# Patient Record
Sex: FEMALE | Race: WHITE
[De-identification: names, ages, dates, MRNs, and addresses within clinical notes are randomized per-mention and may not be internally consistent; named-entity substitution may affect disease eponyms.]

---

## 2018-09-25 ENCOUNTER — HOSPITAL ENCOUNTER (OUTPATIENT)
Dept: HOSPITAL 47 - RADMAMWWP | Age: 38
Discharge: HOME | End: 2018-09-25
Attending: FAMILY MEDICINE
Payer: COMMERCIAL

## 2018-09-25 DIAGNOSIS — N63.0: ICD-10-CM

## 2018-09-25 DIAGNOSIS — R92.8: Primary | ICD-10-CM

## 2018-09-25 PROCEDURE — 77066 DX MAMMO INCL CAD BI: CPT

## 2018-09-26 NOTE — USB
Reason for exam: additional evaluation requested from abnormal screening.



History:

Took other hormone beginning at age 24.



US Breast LT

Left complete breast ultrasound includes all four quadrants, the retroareolar 

region and axilla. Finding demonstrates a 1.5 x 1.4 x 1.2cm irregular, spiculated,

solid, vascular, hypoechoic lesion with calcifications at 3 o'clock and a 2.7 x 

2.4 x 1.2cm enlarged lymph node at the axilla.



These results were verbally communicated with the patient and result sheet given 

to the patient on 9/25/18.





ASSESSMENT: Highly suggestive of malignancy, BI-RAD 5



RECOMMENDATION:

Surgical consultation and ultrasound core biopsy of the left breast.



Called Dr. Calderon with mammographic findings.

Biopsy scheduled for 10/3/18 at 2:20.



PRELIMINARY REPORT CALLED AND FAXED TO DR. CALDERON ON 9/25/18.

## 2018-09-26 NOTE — MM
Reason for exam: clinical finding.



History:

Took other hormone beginning at age 24.



Physical Findings:

Nurse Summary: 1.5cm nodule in the left breast at 3 o'clock (nurse keena).



[facility] Diagnostic Mammo w CAD PARVIN

Bilateral CC and MLO view(s) were taken.  LM view(s) were taken of the left 

breast.

There are scattered fibroglandular densities.

Finding: There is an intermediate concern, suspicious 18 mm spiculated irregular 

mass located 4 cm from the nipple in the 3 o'clock position of the left breast 

consistent with mass associated suspicious calcifications extending posterior 

chest wall.



These results were verbally communicated with the patient and result sheet given 

to the patient on 9/25/15.





ASSESSMENT: Incomplete: need additional imaging evaluation, BI-RAD 0



RECOMMENDATION:

Ultrasound of the left breast.

## 2018-10-01 ENCOUNTER — HOSPITAL ENCOUNTER (OUTPATIENT)
Dept: HOSPITAL 47 - LABWHC1 | Age: 38
Discharge: HOME | End: 2018-10-01
Attending: NURSE PRACTITIONER
Payer: COMMERCIAL

## 2018-10-01 DIAGNOSIS — D68.59: Primary | ICD-10-CM

## 2018-10-01 PROCEDURE — 85246 CLOT FACTOR VIII VW ANTIGEN: CPT

## 2018-10-01 PROCEDURE — 36415 COLL VENOUS BLD VENIPUNCTURE: CPT

## 2018-10-10 ENCOUNTER — HOSPITAL ENCOUNTER (OUTPATIENT)
Dept: HOSPITAL 47 - RADUSWWP | Age: 38
Discharge: HOME | End: 2018-10-10
Attending: FAMILY MEDICINE
Payer: COMMERCIAL

## 2018-10-10 VITALS — TEMPERATURE: 98.1 F | DIASTOLIC BLOOD PRESSURE: 79 MMHG | SYSTOLIC BLOOD PRESSURE: 121 MMHG | HEART RATE: 89 BPM

## 2018-10-10 VITALS — RESPIRATION RATE: 16 BRPM

## 2018-10-10 VITALS — BODY MASS INDEX: 21 KG/M2

## 2018-10-10 DIAGNOSIS — C50.412: Primary | ICD-10-CM

## 2018-10-10 DIAGNOSIS — C77.3: ICD-10-CM

## 2018-10-10 PROCEDURE — 19083 BX BREAST 1ST LESION US IMAG: CPT

## 2018-10-10 PROCEDURE — 88342 IMHCHEM/IMCYTCHM 1ST ANTB: CPT

## 2018-10-10 PROCEDURE — 88305 TISSUE EXAM BY PATHOLOGIST: CPT

## 2018-10-10 PROCEDURE — 77065 DX MAMMO INCL CAD UNI: CPT

## 2018-10-10 PROCEDURE — 19084 BX BREAST ADD LESION US IMAG: CPT

## 2018-10-10 PROCEDURE — 88341 IMHCHEM/IMCYTCHM EA ADD ANTB: CPT

## 2018-10-10 NOTE — USB
EXAMINATION TYPE: US breast needle core addl LT, US breast needle core LT, MG 
diagnostic mammo LT wo CAD

 

DATE OF EXAM: 10/10/2018

 

COMPARISON: EXAMINATION TYPE: US breast needle core addl LT, US breast needle 
core LT, MG diagnostic mammo LT wo CAD

 

DATE OF EXAM: 10/10/2018

 

CLINICAL HISTORY: R92.8 ABNORMAL MAMMOGRAM.

 

TECHNIQUE: Ultrasound guided core biopsy of left breast x 2 (mass and axilla). 

 

COMPARISON: 9/25/2018

 

FINDINGS: The procedure of ultrasound guided core biopsy was explained to the 
patient.  Benefits, alternatives, and risks were discussed.  An informed 
consent was then obtained.  Preprocedural timeout was performed.

 

Site A (breast mass): The patient was placed in supine positioning for imaging 
and for the procedure. The overlying skin was prepped and draped in usual 
sterile fashion. 10 cc of lidocaine buffered with bicarbonate was used as 
anesthetic into the skin and 10 cc of lidocaine with epinephrine subcutaneous 
tissue up to the highly suspicious 1.5 x 1.4 x 1.2 cm mass with internal 
calcifications and vascularity at the 3:00 position.

 

Under ultrasound guidance, a 14-gauge Bard nonvacuum biopsy gun device was used 
to obtain 4 core samples.  Following this, a ribbon-shaped marker was left in 
mass.  

 

The patient tolerated the procedure well without any immediate complication.  

 

 

Site B ( left axillary lymph node): The patient was placed in supine 
positioning for imaging and for the procedure. The overlying skin was prepped 
and draped in usual sterile fashion. 10 cc of lidocaine buffered with 
bicarbonate was used as anesthetic into the skin and 10 cc of lidocaine with 
epinephrine subcutaneous tissue up to the suspicious left axillary lymph node 
with cortical thickening.

 

Under ultrasound guidance, an 18-gauge Bard nonvacuum biopsy gun device was 
used to obtain 3 core samples.  Following this, a hydromark biopsy marker was 
left in lymph node.  

 

The patient tolerated the procedure well without any immediate complication.  

 

Postprocedure imaging demonstrates appropriate placement of the ribbon-shaped 
biopsy marker. The Hydromark clip was well identified sonographically at the 
time of the procedure. The patient was kept in the radiology department for 
short stay after the procedure and then discharged home in stable condition.  

 

IMPRESSION: Successful, uncomplicated ultrasound guided core biopsy of area of 
the highly suspicious left breast mass and left axillary lymph node, full 
pathology results to follow. 

 

Pathology Results: Malignant



A.  BREAST, LEFT, THREE O'CLOCK, ULTRASOUND GUIDED CORE BIOPSY:  Invasive high 
grade ductal carcinoma (grade 3).  See Surgical Pathology Cancer Case Summary.



B.  LEFT AXILLA, CORE BIOPSY:  Lymph node involved by metastatic high grade 
carcinoma consistent with ductal breast primary.  



Recommendation

Surgical consult of the left breast.
MTDD

## 2019-09-11 ENCOUNTER — HOSPITAL ENCOUNTER (OUTPATIENT)
Dept: HOSPITAL 47 - RADECHMAIN | Age: 39
Discharge: HOME | End: 2019-09-11
Attending: INTERNAL MEDICINE
Payer: COMMERCIAL

## 2019-09-11 DIAGNOSIS — C50.812: ICD-10-CM

## 2019-09-11 DIAGNOSIS — I08.1: Primary | ICD-10-CM

## 2019-09-11 PROCEDURE — 93306 TTE W/DOPPLER COMPLETE: CPT

## 2019-09-11 NOTE — ECHOF
Referral Reason:C50.812 Malignant neoplasm of overlapping sites of



MEASUREMENTS

--------

HEIGHT: 154.9 cm

WEIGHT: 49.9 kg

BP: 90/50

RVIDd:   2.6 cm     (< 3.3)

IVSd:   0.8 cm     (0.6 - 1.1)

LVIDd:   3.8 cm     (3.9 - 5.3)

LVPWd:   0.9 cm     (0.6 - 1.1)

IVSs:   1.3 cm

LVIDs:   2.7 cm

LVPWs:   1.2 cm

LA Diam:   2.6 cm     (2.7 - 3.8)

LAESV Index (A-L):   16.24 ml/m

Ao Diam:   2.7 cm     (2.0 - 3.7)

AV Cusp:   2.0 cm     (1.5 - 2.6)

MV EXCURSION:   16.941 mm     (> 18.000)

MV EF SLOPE:   82 mm/s     (70 - 150)

EPSS:   0.3 cm

MV E Kofi:   0.88 m/s

MV DecT:   170 ms

MV A Kofi:   0.57 m/s

MV E/A Ratio:   1.54 

RAP:   5.00 mmHg

RVSP:   24.76 mmHg







FINDINGS

--------

Sinus rhythm.

This was a technically good study.

The left ventricular size is normal.   Left ventricular wall thickness is normal.   Overall left vent
ricular systolic function is normal with, an EF between 60 - 65 %.

The right ventricle is normal in size.

Normal LA  size by volume 22+/-6 ml/m2.

The right atrium is normal in size.

Interatrial and interventricular septum intact.

Aortic valve is trileaflet and is mildly thickened.

The mitral valve leaflets are mildly thickened.   There is trace to mild mitral regurgitation.

Mild tricuspid regurgitation present.   Right ventricular systolic pressure is normal at < 35 mmHg.

Trace/mild (physiologic)  pulmonic regurgitation.

The aortic root size is normal.

Normal inferior vena cava with normal inspiratory collapse consistent with estimated right atrial pre
ssure of  5 mmHg.

There is no pericardial effusion.



CONCLUSIONS

--------

1. Sinus rhythm.

2. This was a technically good study.

3. The left ventricular size is normal.

4. Left ventricular wall thickness is normal.

5. Overall left ventricular systolic function is normal with, an EF between 60 - 65 %.

6. The right ventricle is normal in size.

7. Normal LA size by volume 22+/-6 ml/m2.

8. The right atrium is normal in size.

9. Interatrial and interventricular septum intact.

10. Aortic valve is trileaflet and is mildly thickened.

11. The mitral valve leaflets are mildly thickened.

12. There is trace to mild mitral regurgitation.

13. Mild tricuspid regurgitation present.

14. Right ventricular systolic pressure is normal at < 35 mmHg.

15. Trace/mild (physiologic)  pulmonic regurgitation.

16. The aortic root size is normal.

17. Normal inferior vena cava with normal inspiratory collapse consistent with estimated right atrial
 pressure of  5 mmHg.

18. There is no pericardial effusion.





SONOGRAPHER: Bibi Sams RDCS

## 2020-09-22 ENCOUNTER — HOSPITAL ENCOUNTER (OUTPATIENT)
Dept: HOSPITAL 47 - RADUSWWP | Age: 40
Discharge: HOME | End: 2020-09-22
Attending: FAMILY MEDICINE
Payer: COMMERCIAL

## 2020-09-22 DIAGNOSIS — R94.5: Primary | ICD-10-CM

## 2020-09-22 PROCEDURE — 76700 US EXAM ABDOM COMPLETE: CPT

## 2020-09-22 NOTE — US
EXAMINATION TYPE: US abdomen complete

 

DATE OF EXAM: 9/22/2020

 

COMPARISON: NONE

 

CLINICAL HISTORY: R94.5 abnormal liver function study.

 

EXAM MEASUREMENTS:

 

Liver Length:  11.7 cm   

Gallbladder Wall:  0.2 cm   

CBD:  0.2 cm

Spleen:  10.1 cm   

Right Kidney: 10.1 x 4.3 x 5.4 cm 

Left Kidney:  10.1 x 5.3 x 4.6 cm   

 

 

 

Pancreas:  wnl

Liver:  wnl  

Gallbladder:  wnl

**Evidence for sonographic Rosario's sign: no

CBD:  wnl 

Spleen:  wnl   

Right Kidney:  No hydronephrosis or masses seen   

Left Kidney:  No hydronephrosis or masses seen   

Upper IVC:  wnl  

Abd Aorta:  wnl

 

IMPRESSION: 

1. Normal abdomen ultrasound

## 2020-09-30 ENCOUNTER — HOSPITAL ENCOUNTER (OUTPATIENT)
Dept: HOSPITAL 47 - RADUSWWP | Age: 40
Discharge: HOME | End: 2020-09-30
Payer: COMMERCIAL

## 2020-09-30 DIAGNOSIS — C50.912: Primary | ICD-10-CM

## 2020-09-30 DIAGNOSIS — R22.32: ICD-10-CM

## 2021-06-28 ENCOUNTER — HOSPITAL ENCOUNTER (EMERGENCY)
Dept: HOSPITAL 47 - EC | Age: 41
Discharge: HOME | End: 2021-06-28
Payer: COMMERCIAL

## 2021-06-28 VITALS
DIASTOLIC BLOOD PRESSURE: 69 MMHG | RESPIRATION RATE: 16 BRPM | HEART RATE: 89 BPM | SYSTOLIC BLOOD PRESSURE: 104 MMHG | TEMPERATURE: 98.1 F

## 2021-06-28 DIAGNOSIS — R25.2: ICD-10-CM

## 2021-06-28 DIAGNOSIS — Z88.5: ICD-10-CM

## 2021-06-28 DIAGNOSIS — M79.662: Primary | ICD-10-CM

## 2021-06-28 DIAGNOSIS — Z79.899: ICD-10-CM

## 2021-06-28 DIAGNOSIS — Z88.1: ICD-10-CM

## 2021-06-28 DIAGNOSIS — Z88.2: ICD-10-CM

## 2021-06-28 DIAGNOSIS — Z79.1: ICD-10-CM

## 2021-06-28 DIAGNOSIS — D68.0: ICD-10-CM

## 2021-06-28 DIAGNOSIS — F17.200: ICD-10-CM

## 2021-06-28 DIAGNOSIS — F41.9: ICD-10-CM

## 2021-06-28 DIAGNOSIS — Z90.49: ICD-10-CM

## 2021-06-28 DIAGNOSIS — M79.7: ICD-10-CM

## 2021-06-28 LAB
BASOPHILS # BLD AUTO: 0 K/UL (ref 0–0.2)
BASOPHILS NFR BLD AUTO: 1 %
EOSINOPHIL # BLD AUTO: 0.2 K/UL (ref 0–0.7)
EOSINOPHIL NFR BLD AUTO: 5 %
ERYTHROCYTE [DISTWIDTH] IN BLOOD BY AUTOMATED COUNT: 4.27 M/UL (ref 3.8–5.4)
ERYTHROCYTE [DISTWIDTH] IN BLOOD: 12.7 % (ref 11.5–15.5)
HCT VFR BLD AUTO: 40.3 % (ref 34–46)
HGB BLD-MCNC: 13.3 GM/DL (ref 11.4–16)
LYMPHOCYTES # SPEC AUTO: 1.4 K/UL (ref 1–4.8)
LYMPHOCYTES NFR SPEC AUTO: 28 %
MCH RBC QN AUTO: 31.3 PG (ref 25–35)
MCHC RBC AUTO-ENTMCNC: 33.1 G/DL (ref 31–37)
MCV RBC AUTO: 94.5 FL (ref 80–100)
MONOCYTES # BLD AUTO: 0.3 K/UL (ref 0–1)
MONOCYTES NFR BLD AUTO: 6 %
NEUTROPHILS # BLD AUTO: 3 K/UL (ref 1.3–7.7)
NEUTROPHILS NFR BLD AUTO: 59 %
PLATELET # BLD AUTO: 267 K/UL (ref 150–450)
WBC # BLD AUTO: 5 K/UL (ref 3.8–10.6)

## 2021-06-28 PROCEDURE — 85025 COMPLETE CBC W/AUTO DIFF WBC: CPT

## 2021-06-28 PROCEDURE — 36415 COLL VENOUS BLD VENIPUNCTURE: CPT

## 2021-06-28 PROCEDURE — 99284 EMERGENCY DEPT VISIT MOD MDM: CPT

## 2021-06-28 NOTE — US
EXAMINATION TYPE: US venous doppler duplex LE LT

 

DATE OF EXAM: 6/28/2021 4:11 PM

 

COMPARISON: 9/30/2020

 

CLINICAL HISTORY: pain. Left leg pain

 

SIDE PERFORMED: Left  

 

TECHNIQUE:  The lower extremity deep venous system is examined utilizing real time linear array sonog
luciano with graded compression, doppler sonography and color-flow sonography.

 

VESSELS IMAGED:

Common Femoral Vein

Deep Femoral Vein

Greater Saphenous Vein *

Femoral Vein

Popliteal Vein

Small Saphenous Vein *

Proximal Calf Veins

(* superficial vessels)

 

 

 

Left Leg:  Appears negative for DVT

 

Left calf: no abnormalities seen at patient's area of concern.

 

IMPRESSION:

1. No evidence of deep venous thrombosis in the left lower extremity veins.

## 2021-06-28 NOTE — ED
General Adult HPI





- General


Chief complaint: Extremity Problem,Nontraumatic


Stated complaint: poss DVT lt leg


Source: patient, family, RN notes reviewed


Mode of arrival: ambulatory


Limitations: no limitations





- History of Present Illness


Initial comments: 





41-year-old white female, alert and oriented 4, well-appearing, anxious, 

presents to the emergency room with complaints of left lower leg cramping and 

she feels a knot posteriorly with a pulling sensation.  Patient denies injuries.

 Patient states she is very active however went to [facility] and was told to 

come to the emergency room to rule out DVT.  Patient has no swelling in that leg

is able to ambulate with steady gait.  Patient states that she has a history of 

von Willebrand's and fibromyalgia, surgical history of appendectomy and 

hysterectomy.  Patient was seeing Dr. Norman is a primary care doctor but has h

ad blood work recently.  She called the primary office and they told her to have

a CBC done as well today.  Patient denies any headache, no nausea vomiting 

diarrhea or fevers.  Patient is well-appearing.


Location: left, lower extremity


Severity scale (1-10): 0


Quality: other (cramp)


Consistency: intermittent


Associated Symptoms: denies other symptoms





- Related Data


                                Home Medications











 Medication  Instructions  Recorded  Confirmed


 


Omeprazole 40 mg PO HS 10/02/18 06/28/21


 


Ramelteon [Rozerem] 8 mg PO HS 10/02/18 06/28/21


 


Buprenorphine [Butrans 20 MCG/HOUR] 1 patch TRANSDERM SANTIAGO 06/28/21 06/28/21


 


DULoxetine HCL [Cymbalta] 30 mg PO HS 06/28/21 06/28/21


 


Diclofenac Sodium [Voltaren] 75 mg PO BID 06/28/21 06/28/21


 


Methocarbamol [Robaxin-750] 750 mg PO Q8H 06/28/21 06/28/21


 


Nitrofurantoin Monohyd/M-Cryst 100 mg PO DAILY 06/28/21 06/28/21





[Macrobid]   


 


Vortioxetine Hydrobromide 10 mg PO HS 06/28/21 06/28/21





[Trintellix]   


 


ondansetron HCL [Zofran] 8 mg PO BID PRN 06/28/21 06/28/21


 


ondansetron HCL [Zofran] 8 mg PO HS 06/28/21 06/28/21


 


traMADol HCL 50 mg PO BID PRN 06/28/21 06/28/21











                                    Allergies











Allergy/AdvReac Type Severity Reaction Status Date / Time


 


oxycodone Allergy  Rash/Hives Verified 06/28/21 16:44


 


sulfamethoxazole Allergy  Swelling Verified 06/28/21 16:44





[From Bactrim]     


 


trimethoprim [From Bactrim] Allergy  Swelling Verified 06/28/21 16:44














Review of Systems


ROS Statement: 


Those systems with pertinent positive or pertinent negative responses have been 

documented in the HPI.





ROS Other: All systems not noted in ROS Statement are negative.





Past Medical History


Past Medical History: Blood Disorder, Fibromyalgia


Additional Past Medical History / Comment(s): mild Von Willebrand disease


History of Any Multi-Drug Resistant Organisms: None Reported


Past Surgical History: Appendectomy, Hysterectomy


Additional Past Surgical History / Comment(s): birth francis removal on head, 

bilateral heal spur removal


Past Anesthesia/Blood Transfusion Reactions: No Reported Reaction


Past Psychological History: Anxiety


Smoking Status: Current every day smoker


Past Alcohol Use History: None Reported


Past Drug Use History: None Reported





General Exam


Limitations: no limitations


General appearance: alert, in no apparent distress


Head exam: Present: atraumatic, normocephalic, normal inspection


Eye exam: Present: normal appearance, PERRL, EOMI.  Absent: scleral icterus, 

conjunctival injection, periorbital swelling


ENT exam: Present: normal exam, normal oropharynx, mucous membranes moist


Neck exam: Present: normal inspection, full ROM.  Absent: tenderness, 

meningismus, lymphadenopathy


Respiratory exam: Present: normal lung sounds bilaterally.  Absent: respiratory 

distress, wheezes, rales, rhonchi, stridor, chest wall tenderness, accessory 

muscle use, decreased breath sounds


Cardiovascular Exam: Present: regular rate, normal rhythm, normal heart sounds. 

Absent: systolic murmur, diastolic murmur, rubs, gallop, clicks


GI/Abdominal exam: Present: soft, normal bowel sounds.  Absent: distended, 

tenderness, guarding, rebound, rigid


  ** Left


Knee exam: Present: normal inspection, full ROM.  Absent: tenderness


Lower Leg exam: Present: normal inspection, full ROM, tenderness.  Absent: 

swelling, abrasion, laceration, ecchymosis, deformity, crepitus, dislocation, 

erythema, palpable cord, Homans' sign


Ankle exam: Present: normal inspection


Foot/Toe exam: Present: normal inspection


Neurovascular tendon exam: Present: no vascular compromise.  Absent: pulse 

deficit, abnormal cap refill, motor deficit, sensory deficit, tendon deficit, 

extremity cold to touch, pallor, foot drop


Back exam: Present: full ROM.  Absent: tenderness, CVA tenderness (R), CVA 

tenderness (L), paraspinal tenderness, vertebral tenderness


Neurological exam: Present: alert, oriented X3, CN II-XII intact


Psychiatric exam: Present: normal affect, anxious


Skin exam: Present: warm, dry, intact, normal color.  Absent: rash, cyanosis, 

diaphoretic, erythema, petechiae, pallor, mottled





Course


                                   Vital Signs











  06/28/21





  13:34


 


Temperature 98.1 F


 


Pulse Rate 89


 


Respiratory 16





Rate 


 


Blood Pressure 104/69


 


O2 Sat by Pulse 99





Oximetry 














Medical Decision Making





- Medical Decision Making





WBC count is 5.0, hemoglobin and hematocrit is 13 and 40 respectively.  

Ultrasound of the left leg negative for DVT.  Patient will be discharged home to

follow up with her primary care doctor.  Case discussed with Dr. Whitley





- Lab Data


Result diagrams: 


                                 06/28/21 15:10





                                   Lab Results











  06/28/21 Range/Units





  15:10 


 


WBC  5.0  (3.8-10.6)  k/uL


 


RBC  4.27  (3.80-5.40)  m/uL


 


Hgb  13.3  (11.4-16.0)  gm/dL


 


Hct  40.3  (34.0-46.0)  %


 


MCV  94.5  (80.0-100.0)  fL


 


MCH  31.3  (25.0-35.0)  pg


 


MCHC  33.1  (31.0-37.0)  g/dL


 


RDW  12.7  (11.5-15.5)  %


 


Plt Count  267  (150-450)  k/uL


 


MPV  7.5  


 


Neutrophils %  59  %


 


Lymphocytes %  28  %


 


Monocytes %  6  %


 


Eosinophils %  5  %


 


Basophils %  1  %


 


Neutrophils #  3.0  (1.3-7.7)  k/uL


 


Lymphocytes #  1.4  (1.0-4.8)  k/uL


 


Monocytes #  0.3  (0-1.0)  k/uL


 


Eosinophils #  0.2  (0-0.7)  k/uL


 


Basophils #  0.0  (0-0.2)  k/uL














Disposition


Clinical Impression: 


 Leg pain, left





Disposition: HOME SELF-CARE


Condition: Good


Instructions (If sedation given, give patient instructions):  Leg Pain (ED)


Additional Instructions: 


Follow-up with the primary care doctor return if worsening symptoms.


Is patient prescribed a controlled substance at d/c from ED?: No


Referrals: 


Daina Balderas MD [Primary Care Provider] - 1-2 days


Time of Disposition: 17:01

## 2021-08-09 ENCOUNTER — HOSPITAL ENCOUNTER (OUTPATIENT)
Dept: HOSPITAL 47 - RADMRIMAIN | Age: 41
Discharge: HOME | End: 2021-08-09
Attending: ANESTHESIOLOGY
Payer: COMMERCIAL

## 2021-08-09 DIAGNOSIS — M99.71: Primary | ICD-10-CM

## 2021-08-09 DIAGNOSIS — M40.292: ICD-10-CM

## 2021-08-09 DIAGNOSIS — M89.38: ICD-10-CM

## 2021-08-09 DIAGNOSIS — M50.223: ICD-10-CM

## 2021-08-09 PROCEDURE — 72141 MRI NECK SPINE W/O DYE: CPT

## 2021-08-09 PROCEDURE — 72148 MRI LUMBAR SPINE W/O DYE: CPT

## 2021-08-10 NOTE — MR
EXAMINATION TYPE: MR jeniferine/eric wo con

 

DATE OF EXAM: 8/9/2021

 

COMPARISON: None

 

HISTORY: Neck and back pain, Headaches, Muscle aches and bone pain thru body. Breast Ca history

 

CONTRAST:  Performed utilizing 0 mL intravenous Gadavist gadolinium contrast.  

 

TECHNIQUE: Multiplanar multiecho imaging on a 3.0 Valery magnet is performed through the cervical spin
e.

 

FINDINGS: The craniovertebral junction is normal.  Vertebral body alignment is normal.

 

C7-T1:  No focal disc herniation or significant disc bulge is evident. No spinal canal stenosis is pr
esent. Uncovertebral joint hypertrophy has mild right foraminal narrowing.

 

C6-7: There is a small central protrusion with mild anterior thecal sac compression. No AP spinal can
al stenosis is present. Mild right foraminal narrowing is present.

 

C5-6: Broad-based disc bulge is moderate anterior thecal sac compression. Comes in close approximatio
n with the spinal cord. Some spinal cord flattening may be present. No AP spinal canal stenosis is pr
esent. Minimal bilateral foraminal narrowing is present. There is a kyphosis centered at this level..


 

C4-5: Minimal disc bulge is present with anterior thecal sac compression. This is in close approximat
ion with the spinal cord.

 

C3-4: No focal disc herniation or significant disc bulge is evident.  No spinal canal stenosis or ginny
ral foraminal stenosis is present.

 

C2-3: No focal disc herniation or significant disc bulge is evident.  No spinal canal stenosis or ginny
ral foraminal stenosis is present.

 

 

IMPRESSIONS:

 

1. Broad-based disc bulge C5-6 with moderate anterior thecal sac compression and mild cord flattening
.

2. Focal kyphosis at the C5-6 level.

3. Small central protrusion C6-7 mild bulging C4-5 without stenosis or cord compression.

4. Mild foraminal narrowing inferior cervical spine.

 

EXAMINATION TYPE: MR davis/eric wo con

 

DATE OF EXAM: 8/9/2021

 

COMPARISON: None

 

HISTORY: Neck and back pain, Headaches, Muscle aches and bone pain thru body. Breast Ca history

 

CONTRAST: 0 mL intravenous Gadavist.

 

TECHNIQUE: 

Multiplanar, multisequence images of the lumbar spine were acquired.

 

FINDINGS:  

L5-S1: No significant disc bulge or disc herniation.  No spinal canal stenosis.  No foraminal stenosi
s.

 

L4-L5: No significant disc bulge or disc herniation.  No spinal canal stenosis.  No foraminal stenosi
s.  

 

L3-L4: No significant disc bulge or disc herniation.  No spinal canal stenosis.  There is some facet 
hypertrophy with ligamentum flavum laxity at the L3-4 level. This has some right posterior lateral th
ecal sac compression.  

 

L2-L3: No significant disc bulge or disc herniation.  No spinal canal stenosis.  No foraminal stenosi
s.  

 

L1-L2: No significant disc bulge or disc herniation.  No spinal canal stenosis.  No foraminal stenosi
s.  

 

T12-L1: No significant disc bulge or disc herniation.  No spinal canal stenosis.  No foraminal stenos
is.  

 

 

 

IMPRESSION:

1. Mild facet hypertrophy L3-4 with posterior lateral thecal sac compression, greater on the right.

2. Lumbar spine otherwise appears unremarkable.

## 2021-11-18 ENCOUNTER — HOSPITAL ENCOUNTER (OUTPATIENT)
Dept: HOSPITAL 47 - RADXRMAIN | Age: 41
End: 2021-11-18
Attending: PHYSICAL MEDICINE & REHABILITATION
Payer: COMMERCIAL

## 2021-11-18 DIAGNOSIS — M53.3: Primary | ICD-10-CM

## 2021-11-18 PROCEDURE — 72220 X-RAY EXAM SACRUM TAILBONE: CPT

## 2021-11-18 NOTE — XR
EXAMINATION TYPE: XR sacrum coccyx

 

DATE OF EXAM: 11/18/2021

 

CLINICAL HISTORY: pain

 

TECHNIQUE: Three views of the sacrum and coccyx are submitted.  

 

COMPARISON: None

 

Sacral alae appear symmetric.  No evidence for fracture or bony lesion.  Sacroiliac joints are within
 normal limits.  Visualized coccygeal segments are free of fracture or lesion.

 

 

IMPRESSION: Normal study

## 2023-05-10 ENCOUNTER — HOSPITAL ENCOUNTER (OUTPATIENT)
Dept: HOSPITAL 47 - RADCTMAIN | Age: 43
Discharge: HOME | End: 2023-05-10
Attending: INTERNAL MEDICINE
Payer: COMMERCIAL

## 2023-05-10 DIAGNOSIS — C50.812: Primary | ICD-10-CM

## 2023-05-10 PROCEDURE — 71260 CT THORAX DX C+: CPT

## 2023-05-11 NOTE — CT
EXAMINATION TYPE: CT chest w con

 

DATE OF EXAM: 5/10/2023

 

COMPARISON: None

 

HISTORY: Breast Cancer follow up

 

CT DLP: 134.5 mGycm, Automated exposure control for dose reduction was used.

 

CONTRAST: Performed injected with 100 cc mL of Isovue 300.

 

TECHNIQUE: Axial images were obtained at 5 mm thick sections.  Reconstructed images are reviewed on [facility] computer in the coronal plane. 

 

FINDINGS: Portion of the thyroid visualized is normal.

 

Bilateral breast prostheses are present.

No suspicious lung nodules or focal infiltrates are present.

 

No enlarged mediastinal or hilar adenopathy is evident.   The ascending aorta diameter at the level o
f the main pulmonary artery is 2.6 cm.  The main pulmonary artery diameter at the bifurcation is 2.4 
cm.

 

Limited CT sections are obtained through the upper abdomen. Abdomen is essentially unremarkable. Osse
ous structures appear unremarkable.

 

IMPRESSIONS:

1. No suspicious changes to suggest recurrent or metastatic breast cancer